# Patient Record
Sex: MALE | Race: WHITE | NOT HISPANIC OR LATINO
[De-identification: names, ages, dates, MRNs, and addresses within clinical notes are randomized per-mention and may not be internally consistent; named-entity substitution may affect disease eponyms.]

---

## 2022-06-24 ENCOUNTER — APPOINTMENT (OUTPATIENT)
Dept: ORTHOPEDIC SURGERY | Facility: CLINIC | Age: 72
End: 2022-06-24

## 2022-06-24 ENCOUNTER — NON-APPOINTMENT (OUTPATIENT)
Age: 72
End: 2022-06-24

## 2022-06-24 VITALS — WEIGHT: 157.2 LBS | BODY MASS INDEX: 23.28 KG/M2 | HEIGHT: 69 IN

## 2022-06-24 PROBLEM — Z00.00 ENCOUNTER FOR PREVENTIVE HEALTH EXAMINATION: Status: ACTIVE | Noted: 2022-06-24

## 2022-06-24 PROCEDURE — 20611 DRAIN/INJ JOINT/BURSA W/US: CPT | Mod: LT

## 2022-06-24 PROCEDURE — 73562 X-RAY EXAM OF KNEE 3: CPT | Mod: LT

## 2022-06-24 PROCEDURE — 99204 OFFICE O/P NEW MOD 45 MIN: CPT | Mod: 25

## 2022-06-24 RX ORDER — WARFARIN 5 MG/1
5 TABLET ORAL
Qty: 90 | Refills: 0 | Status: ACTIVE | COMMUNITY
Start: 2022-01-26

## 2022-06-24 RX ORDER — SIMVASTATIN 40 MG/1
40 TABLET, FILM COATED ORAL
Qty: 90 | Refills: 0 | Status: ACTIVE | COMMUNITY
Start: 2022-04-05

## 2022-06-24 RX ORDER — TRETINOIN 1 MG/G
0.1 CREAM TOPICAL
Qty: 45 | Refills: 0 | Status: ACTIVE | COMMUNITY
Start: 2021-10-12

## 2022-06-24 RX ORDER — METHYLPREDNISOLONE 4 MG/1
4 TABLET ORAL
Qty: 1 | Refills: 3 | Status: ACTIVE | COMMUNITY
Start: 2022-06-24 | End: 1900-01-01

## 2022-06-24 RX ORDER — DILTIAZEM HYDROCHLORIDE 240 MG/1
240 CAPSULE, EXTENDED RELEASE ORAL
Qty: 90 | Refills: 0 | Status: ACTIVE | COMMUNITY
Start: 2022-04-05

## 2022-06-24 RX ORDER — OFLOXACIN 3 MG/ML
0.3 SOLUTION/ DROPS OPHTHALMIC
Qty: 5 | Refills: 0 | Status: ACTIVE | COMMUNITY
Start: 2022-05-12

## 2022-06-24 RX ORDER — FINASTERIDE 5 MG/1
5 TABLET, FILM COATED ORAL
Qty: 90 | Refills: 0 | Status: ACTIVE | COMMUNITY
Start: 2022-05-10

## 2022-06-24 RX ORDER — DOXYCYCLINE HYCLATE 100 MG/1
100 TABLET ORAL
Qty: 14 | Refills: 0 | Status: ACTIVE | COMMUNITY
Start: 2022-05-10

## 2022-06-24 RX ORDER — DORZOLAMIDE HYDROCHLORIDE TIMOLOL MALEATE 20; 5 MG/ML; MG/ML
22.3-6.8 SOLUTION/ DROPS OPHTHALMIC
Qty: 10 | Refills: 0 | Status: ACTIVE | COMMUNITY
Start: 2022-01-27

## 2022-06-24 RX ORDER — ACYCLOVIR 400 MG/1
400 TABLET ORAL
Qty: 90 | Refills: 0 | Status: ACTIVE | COMMUNITY
Start: 2022-06-02

## 2022-07-11 ENCOUNTER — TRANSCRIPTION ENCOUNTER (OUTPATIENT)
Age: 72
End: 2022-07-11

## 2022-07-14 RX ORDER — HYALURONATE SODIUM, STABILIZED 88 MG/4 ML
88 SYRINGE (ML) INTRAARTICULAR
Qty: 1 | Refills: 0 | Status: ACTIVE | OUTPATIENT
Start: 2022-07-14

## 2022-08-01 ENCOUNTER — APPOINTMENT (OUTPATIENT)
Dept: ORTHOPEDIC SURGERY | Facility: CLINIC | Age: 72
End: 2022-08-01

## 2022-08-01 PROCEDURE — 99213 OFFICE O/P EST LOW 20 MIN: CPT | Mod: 25

## 2022-08-01 PROCEDURE — 20610 DRAIN/INJ JOINT/BURSA W/O US: CPT | Mod: LT

## 2022-10-18 NOTE — PROCEDURE
[de-identified] : Patient was given a cortisone injection (Lidocaine 1% 4 cc + 10 mg of Kenalog) in the lateral compartment of the left knee. Injection is performed under sterile conditions with ultrasound guidance. Patient tolerated procedure well. If patient has a history of insulin- dependant diabetes they were informed of possible increase of blood glucose levels and instructed to adjust insulin accordingly.\par \par Manufacture [employer]\par Drug name  LIDOCAINE\par NDC #  0064-2387-33\par Lot #  [id number]\par Expiration date   05/2023\par \par Manufacture  [employer]\par Drug name  KENALOG\par NDC #  1295-1796-77\par Lot #  [id number]\par Expiration date  OCT 2023\par \par

## 2022-10-18 NOTE — PHYSICAL EXAM
[de-identified] : Left knee has some mild tenderness palpation of the medial joint line there is mild warmth soft tissue swelling is noted a small effusion is also noted range of motion 0 to 130 degrees the knee is stable to AP stress varus valgus stress in both full extension and 90 degrees of flexion. [de-identified] : Knee x-rays were ordered today AP standing individual lateral and sunrise views were obtained there is near complete cartilage loss of the medial compartment on standing AP projection of the left knee.  There is moderate narrowing of the lateral patellofemoral facet of the left knee on the sunrise view.

## 2022-10-18 NOTE — DISCUSSION/SUMMARY
[Medication Risks Reviewed] : Medication risks reviewed [de-identified] : Patient I had a long discussion about the underlying etiology of his left knee pain.  He has fairly advanced radiographic findings he is also limping I believe that in the end ultimately knee replacement surgery will be our option.  We will embrace conservative measures for now patient was given a cortisone injection today he will also be placed on a Medrol Dosepak the reasonable risks and benefits of medication were discussed in detail.  We will also be ordering a Monovisc injection for the patient.  He will be notified once the Monovisc injection is available for use.

## 2022-10-18 NOTE — PHYSICAL EXAM
[de-identified] : Left knee has some mild tenderness palpation of the medial joint line there is mild warmth soft tissue swelling is noted a small effusion is also noted range of motion 0 to 130 degrees the knee is stable to AP stress varus valgus stress in both full extension and 90 degrees of flexion.

## 2022-10-18 NOTE — HISTORY OF PRESENT ILLNESS
[de-identified] : First-time visit for this patient he is here with a complaint of 6-month history left knee medial pain.  He recalls no specific accident or injury patient is now limping he states he has difficulty with stair climbing getting out of a seated position and a throbbing sensation at night.

## 2022-10-18 NOTE — HISTORY OF PRESENT ILLNESS
[de-identified] : Patient returns today for left knee Monovisc injection.  Recall he has moderate to advanced arthritis of the left knee.

## 2022-10-18 NOTE — PROCEDURE
[de-identified] : Patient was given a left knee Monovisc today.  This was done under sterile conditions to the lateral compartment of the left knee.\par Manufacture : [employer]\par Drug name : monovisc\par NDC #  [id number]\par Lot #  [id number]\par Dosage  .4ml 22mg/ml\par Expiration date  2023-05-31\par Patient was given Monovisc injection (88mg/4ml) in the lateral compartment of the knee. Injection is performed under sterile conditions with ultrasound guidance. Patient tolerated procedure well.

## 2022-10-18 NOTE — DISCUSSION/SUMMARY
[de-identified] : Patient will ice the knee here today also tonight if symptomatic.  He will monitor his symptoms follow-up as needed.